# Patient Record
Sex: MALE
[De-identification: names, ages, dates, MRNs, and addresses within clinical notes are randomized per-mention and may not be internally consistent; named-entity substitution may affect disease eponyms.]

---

## 2019-06-10 ENCOUNTER — NON-APPOINTMENT (OUTPATIENT)
Age: 34
End: 2019-06-10

## 2019-06-10 ENCOUNTER — LABORATORY RESULT (OUTPATIENT)
Age: 34
End: 2019-06-10

## 2019-06-10 ENCOUNTER — APPOINTMENT (OUTPATIENT)
Dept: CARDIOLOGY | Facility: CLINIC | Age: 34
End: 2019-06-10
Payer: COMMERCIAL

## 2019-06-10 VITALS
BODY MASS INDEX: 38.36 KG/M2 | HEIGHT: 76 IN | SYSTOLIC BLOOD PRESSURE: 148 MMHG | OXYGEN SATURATION: 97 % | DIASTOLIC BLOOD PRESSURE: 74 MMHG | WEIGHT: 315 LBS | HEART RATE: 98 BPM | RESPIRATION RATE: 18 BRPM

## 2019-06-10 DIAGNOSIS — Z00.00 ENCOUNTER FOR GENERAL ADULT MEDICAL EXAMINATION W/OUT ABNORMAL FINDINGS: ICD-10-CM

## 2019-06-10 DIAGNOSIS — Z92.89 PERSONAL HISTORY OF OTHER MEDICAL TREATMENT: ICD-10-CM

## 2019-06-10 DIAGNOSIS — Z83.3 FAMILY HISTORY OF DIABETES MELLITUS: ICD-10-CM

## 2019-06-10 PROCEDURE — 93000 ELECTROCARDIOGRAM COMPLETE: CPT

## 2019-06-10 PROCEDURE — 99204 OFFICE O/P NEW MOD 45 MIN: CPT

## 2019-06-10 RX ORDER — LABETALOL HYDROCHLORIDE 200 MG/1
200 TABLET, FILM COATED ORAL TWICE DAILY
Qty: 120 | Refills: 3 | Status: ACTIVE | COMMUNITY
Start: 2019-06-10 | End: 1900-01-01

## 2019-06-10 RX ORDER — IXEKIZUMAB 80 MG/ML
80 INJECTION, SOLUTION SUBCUTANEOUS
Refills: 0 | Status: ACTIVE | COMMUNITY

## 2019-06-10 RX ORDER — FUROSEMIDE 20 MG/1
20 TABLET ORAL
Qty: 14 | Refills: 1 | Status: ACTIVE | COMMUNITY
Start: 2019-06-10 | End: 1900-01-01

## 2019-06-10 RX ORDER — LOSARTAN POTASSIUM 100 MG/1
100 TABLET, FILM COATED ORAL DAILY
Qty: 1 | Refills: 3 | Status: ACTIVE | COMMUNITY
Start: 1900-01-01 | End: 1900-01-01

## 2019-06-10 RX ORDER — AMLODIPINE BESYLATE 10 MG/1
10 TABLET ORAL
Refills: 0 | Status: DISCONTINUED | COMMUNITY
End: 2019-06-10

## 2019-06-11 ENCOUNTER — APPOINTMENT (OUTPATIENT)
Dept: CARDIOLOGY | Facility: CLINIC | Age: 34
End: 2019-06-11
Payer: COMMERCIAL

## 2019-06-11 ENCOUNTER — MEDICATION RENEWAL (OUTPATIENT)
Age: 34
End: 2019-06-11

## 2019-06-11 DIAGNOSIS — E78.5 HYPERLIPIDEMIA, UNSPECIFIED: ICD-10-CM

## 2019-06-11 LAB
ALBUMIN SERPL ELPH-MCNC: 4.8 G/DL
ALP BLD-CCNC: 119 U/L
ALT SERPL-CCNC: 73 U/L
ANION GAP SERPL CALC-SCNC: 15 MMOL/L
AST SERPL-CCNC: 33 U/L
BASOPHILS # BLD AUTO: 0.04 K/UL
BASOPHILS NFR BLD AUTO: 0.4 %
BILIRUB SERPL-MCNC: 0.3 MG/DL
BUN SERPL-MCNC: 14 MG/DL
CALCIUM SERPL-MCNC: 10 MG/DL
CHLORIDE SERPL-SCNC: 106 MMOL/L
CHOLEST SERPL-MCNC: 213 MG/DL
CHOLEST/HDLC SERPL: 6.1 RATIO
CO2 SERPL-SCNC: 24 MMOL/L
CREAT SERPL-MCNC: 0.78 MG/DL
EOSINOPHIL # BLD AUTO: 0.25 K/UL
EOSINOPHIL NFR BLD AUTO: 2.7 %
ESTIMATED AVERAGE GLUCOSE: 108 MG/DL
GGT SERPL-CCNC: 58 U/L
GLUCOSE SERPL-MCNC: 87 MG/DL
HBA1C MFR BLD HPLC: 5.4 %
HCT VFR BLD CALC: 49.5 %
HDLC SERPL-MCNC: 35 MG/DL
HGB BLD-MCNC: 15.8 G/DL
IMM GRANULOCYTES NFR BLD AUTO: 0.3 %
LDLC SERPL CALC-MCNC: 139 MG/DL
LYMPHOCYTES # BLD AUTO: 2.6 K/UL
LYMPHOCYTES NFR BLD AUTO: 27.9 %
MAGNESIUM SERPL-MCNC: 2.2 MG/DL
MAN DIFF?: NORMAL
MCHC RBC-ENTMCNC: 27.3 PG
MCHC RBC-ENTMCNC: 31.9 GM/DL
MCV RBC AUTO: 85.5 FL
MONOCYTES # BLD AUTO: 0.65 K/UL
MONOCYTES NFR BLD AUTO: 7 %
NEUTROPHILS # BLD AUTO: 5.74 K/UL
NEUTROPHILS NFR BLD AUTO: 61.7 %
NT-PROBNP SERPL-MCNC: 27 PG/ML
PHOSPHATE SERPL-MCNC: 4.2 MG/DL
PLATELET # BLD AUTO: 195 K/UL
POTASSIUM SERPL-SCNC: 4.5 MMOL/L
PROT SERPL-MCNC: 8 G/DL
RBC # BLD: 5.79 M/UL
RBC # FLD: 13.1 %
SODIUM SERPL-SCNC: 145 MMOL/L
T3RU NFR SERPL: 1.1 TBI
T4 FREE SERPL-MCNC: 1 NG/DL
T4 SERPL-MCNC: 5.4 UG/DL
TRIGL SERPL-MCNC: 196 MG/DL
TSH SERPL-ACNC: 1.83 UIU/ML
URATE SERPL-MCNC: 5.9 MG/DL
WBC # FLD AUTO: 9.31 K/UL

## 2019-06-11 PROCEDURE — 93306 TTE W/DOPPLER COMPLETE: CPT

## 2019-06-12 ENCOUNTER — APPOINTMENT (OUTPATIENT)
Dept: CARDIOLOGY | Facility: CLINIC | Age: 34
End: 2019-06-12

## 2019-06-12 LAB — 25(OH)D3 SERPL-MCNC: 30.3 NG/ML

## 2019-06-14 PROBLEM — Z00.00 ENCOUNTER FOR PREVENTIVE HEALTH EXAMINATION: Status: ACTIVE | Noted: 2019-06-05

## 2019-06-14 PROBLEM — Z92.89 HISTORY OF RECENT HOSPITALIZATION: Status: ACTIVE | Noted: 2019-06-14

## 2019-06-14 NOTE — PHYSICAL EXAM
[Normal Appearance] : normal appearance [General Appearance - Well Developed] : well developed [General Appearance - Well Nourished] : well nourished [Well Groomed] : well groomed [No Deformities] : no deformities [General Appearance - In No Acute Distress] : no acute distress [Normal Conjunctiva] : the conjunctiva exhibited no abnormalities [Normal Oral Mucosa] : normal oral mucosa [Eyelids - No Xanthelasma] : the eyelids demonstrated no xanthelasmas [No Oral Pallor] : no oral pallor [No Oral Cyanosis] : no oral cyanosis [Normal Jugular Venous V Waves Present] : normal jugular venous V waves present [No Jugular Venous Silverio A Waves] : no jugular venous silevrio A waves [Normal Jugular Venous A Waves Present] : normal jugular venous A waves present [Respiration, Rhythm And Depth] : normal respiratory rhythm and effort [Exaggerated Use Of Accessory Muscles For Inspiration] : no accessory muscle use [Auscultation Breath Sounds / Voice Sounds] : lungs were clear to auscultation bilaterally [Abdomen Soft] : soft [Abdomen Tenderness] : non-tender [Abdomen Mass (___ Cm)] : no abdominal mass palpated [Abnormal Walk] : normal gait [Gait - Sufficient For Exercise Testing] : the gait was sufficient for exercise testing [Nail Clubbing] : no clubbing of the fingernails [Cyanosis, Localized] : no localized cyanosis [Petechial Hemorrhages (___cm)] : no petechial hemorrhages [Skin Color & Pigmentation] : normal skin color and pigmentation [] : no rash [No Venous Stasis] : no venous stasis [Skin Lesions] : no skin lesions [No Skin Ulcers] : no skin ulcer [No Xanthoma] : no  xanthoma was observed [Oriented To Time, Place, And Person] : oriented to person, place, and time [Affect] : the affect was normal [Mood] : the mood was normal [No Anxiety] : not feeling anxious [FreeTextEntry1] : Pt has 2 +edema, pitting bilaterally legs

## 2019-06-14 NOTE — HISTORY OF PRESENT ILLNESS
[FreeTextEntry1] : The patient presents today with complaints of occasional episodes of dyspnea that occur with rest and and exertion there's no regular  discernible pattern the patient does not feel it is associated with anxiety patient denies substernal chest pain, PND, with orthopnea and palpitations\par Patient is complaining about substernal chest discomfort occurring  with and without exertion over the past several days. The patient denies increased in frequency or change in the quality of the pain during this time period. The pain is in the center of the chest radiating to left side and can occasionally go to then to the left arm and into the epigastric area. The patient denies associated diaphoresis, nausea, cough, sputum production, wheezing, pedal edema, PND or palpitations.\par [facility] he had a workup there was told everything was  "normal".Since that time his no have become much more swollen and he is concerned because of his worsening symptomatology as described above. He presents today with his significant other who works at [employer].\par \par \par

## 2019-07-03 ENCOUNTER — MEDICATION RENEWAL (OUTPATIENT)
Age: 34
End: 2019-07-03

## 2019-07-03 RX ORDER — ATORVASTATIN CALCIUM 20 MG/1
20 TABLET, FILM COATED ORAL
Qty: 90 | Refills: 1 | Status: ACTIVE | COMMUNITY
Start: 2019-06-11 | End: 1900-01-01

## 2019-08-14 ENCOUNTER — APPOINTMENT (OUTPATIENT)
Dept: OTOLARYNGOLOGY | Facility: CLINIC | Age: 34
End: 2019-08-14
Payer: COMMERCIAL

## 2019-08-14 VITALS
WEIGHT: 315 LBS | BODY MASS INDEX: 38.36 KG/M2 | DIASTOLIC BLOOD PRESSURE: 85 MMHG | SYSTOLIC BLOOD PRESSURE: 133 MMHG | HEIGHT: 76 IN | HEART RATE: 76 BPM

## 2019-08-14 DIAGNOSIS — R06.89 OTHER ABNORMALITIES OF BREATHING: ICD-10-CM

## 2019-08-14 DIAGNOSIS — Z78.9 OTHER SPECIFIED HEALTH STATUS: ICD-10-CM

## 2019-08-14 DIAGNOSIS — G47.30 SLEEP APNEA, UNSPECIFIED: ICD-10-CM

## 2019-08-14 DIAGNOSIS — R94.31 ABNORMAL ELECTROCARDIOGRAM [ECG] [EKG]: ICD-10-CM

## 2019-08-14 DIAGNOSIS — J34.2 DEVIATED NASAL SEPTUM: ICD-10-CM

## 2019-08-14 DIAGNOSIS — I10 ESSENTIAL (PRIMARY) HYPERTENSION: ICD-10-CM

## 2019-08-14 DIAGNOSIS — Z82.49 FAMILY HISTORY OF ISCHEMIC HEART DISEASE AND OTHER DISEASES OF THE CIRCULATORY SYSTEM: ICD-10-CM

## 2019-08-14 DIAGNOSIS — E66.01 MORBID (SEVERE) OBESITY DUE TO EXCESS CALORIES: ICD-10-CM

## 2019-08-14 DIAGNOSIS — Z82.2 FAMILY HISTORY OF DEAFNESS AND HEARING LOSS: ICD-10-CM

## 2019-08-14 DIAGNOSIS — R60.0 LOCALIZED EDEMA: ICD-10-CM

## 2019-08-14 DIAGNOSIS — R06.09 OTHER FORMS OF DYSPNEA: ICD-10-CM

## 2019-08-14 PROCEDURE — 31575 DIAGNOSTIC LARYNGOSCOPY: CPT

## 2019-08-14 PROCEDURE — 99205 OFFICE O/P NEW HI 60 MIN: CPT | Mod: 25

## 2019-08-14 RX ORDER — MOMETASONE 50 UG/1
50 SPRAY, METERED NASAL DAILY
Qty: 1 | Refills: 5 | Status: ACTIVE | COMMUNITY
Start: 2019-08-14 | End: 1900-01-01

## 2019-08-14 RX ORDER — FLUTICASONE PROPIONATE 50 UG/1
50 SPRAY, METERED NASAL DAILY
Qty: 1 | Refills: 5 | Status: ACTIVE | COMMUNITY
Start: 2019-08-14 | End: 1900-01-01

## 2019-08-14 NOTE — PROCEDURE
[Image(s) Captured] : image(s) captured and filed [Unable to Cooperate with Mirror] : patient unable to cooperate with mirror [Obstruction] : acute airway obstruction evaluation [Complicated Symptoms] : complicated symptoms requiring more thorough examination than provided by mirror [Topical Lidocaine] : topical lidocaine [Oxymetazoline HCl] : oxymetazoline HCl [Flexible Endoscope] : examined with the flexible endoscope [Serial Number: ___] : Serial Number: [unfilled] [Velopharynx ___ %] : the velopharynx was [unfilled]U% collapsed [Normal] : the false vocal folds were pink and regular, the ventricular sulcus was open, the true vocal folds were glistening white, tense and of equal length, mobility, and height [True Vocal Cords Paralysis] : no true vocal cord paralysis [True Vocal Cords Erythematous] : no true vocal cord edema [True Vocal Cords Uriostegui's Nodules] : no true vocal cord nodules [Glottis Arytenoid Cartilages] : no arytenoid granulomas [Glottis Arytenoid Cartilages Erythema] : no arytenoid erythema [Arytenoid Edema ___ /4] : bilaterally were normal [Arytenoid Erythema ___ /4] : bilaterally were normal [de-identified] : Patient was placed in the examination chair in a sitting position. The nose was decongested with oxymetazoline nasal solution. The airway was anesthetized with 4% Xylocaine.  The back of the throat was anesthetized with Cetacaine. Direct flexible/rigid video endoscopy was performed. Findings revealed:\par Patient a bilaterally deviated fractured nasal septum turbinate hypertrophy positive Reyna was with 4+ tonsils meeting in the midline. An aspiration area completely closed. Base of tongue looked normal larynx normal [de-identified] : GISSELL [FreeTextEntry3] : + Kiran

## 2019-08-14 NOTE — PHYSICAL EXAM
[] : septum deviated bilaterally [Midline] : trachea located in midline position [Laryngoscopy Performed] : laryngoscopy was performed, see procedure section for findings [Normal] : no rashes [FreeTextEntry1] : obese [de-identified] : swollen [de-identified] : 4+ midline

## 2019-08-14 NOTE — CONSULT LETTER
[Dear  ___] : Dear  [unfilled], [Consult Letter:] : I had the pleasure of evaluating your patient, [unfilled]. [Consult Closing:] : Thank you very much for allowing me to participate in the care of this patient.  If you have any questions, please do not hesitate to contact me. [Please see my note below.] : Please see my note below. [Sincerely,] : Sincerely, [FreeTextEntry3] : Murphy Mckay MD, IZABEL, FACS\par  Department Otolaryngology\par Director of [facility]\par Professor of Otolaryngology, \par [facility] [employer]\par

## 2019-08-14 NOTE — HISTORY OF PRESENT ILLNESS
[de-identified] : 33 year old male here for difficulty breathing through the nose.  States has had difficulty breathing through the nose for years.  States nasal congestion, sinus pain/pressure, post nasal drip.  States is a mouth breathing, wakes in the am with thick phlegm in the back of the throat.  Denies sinus infections in the past year.  States 1.5 weeks ago had a sleep study, no results in yet.  STates used to play a lot of sports, not sure if even broke his nose or not. The patient previously on Flonase without relief over years. Patient just had an angiogram. Patient is on multiple medications to control blood pressure. Pt. his cardiologist every week to try to manage this problem. Patient did have a sleep study with CPAP trial. When they  put the mask on he felt that he was choking and had no airway.

## 2019-08-14 NOTE — REASON FOR VISIT
[Initial Evaluation] : an initial evaluation for [Other: _____] : [unfilled] [FreeTextEntry2] : here for difficulty breathing through the nose

## 2019-09-11 ENCOUNTER — RX RENEWAL (OUTPATIENT)
Age: 34
End: 2019-09-11

## 2019-09-11 RX ORDER — FLUTICASONE PROPIONATE 50 UG/1
50 SPRAY, METERED NASAL DAILY
Qty: 3 | Refills: 3 | Status: ACTIVE | COMMUNITY
Start: 2019-09-11 | End: 1900-01-01

## 2021-10-05 ENCOUNTER — APPOINTMENT (OUTPATIENT)
Dept: UROLOGY | Facility: CLINIC | Age: 36
End: 2021-10-05

## 2021-10-05 NOTE — PHYSICAL EXAM
[General Appearance - Well Developed] : well developed [General Appearance - Well Nourished] : well nourished [Normal Appearance] : normal appearance [Well Groomed] : well groomed [General Appearance - In No Acute Distress] : no acute distress [Heart Rate And Rhythm] : Heart rate and rhythm were normal [Arterial Pulses Normal] : the pedal pulses were normal [Edema] : no peripheral edema [Respiration, Rhythm And Depth] : normal respiratory rhythm and effort [Exaggerated Use Of Accessory Muscles For Inspiration] : no accessory muscle use [Auscultation Breath Sounds / Voice Sounds] : lungs were clear to auscultation bilaterally [Chest Palpation] : palpation of the chest revealed no abnormalities [Lungs Percussion] : the lungs were normal to percussion [Bowel Sounds] : normal bowel sounds [Abdomen Soft] : soft [Abdomen Tenderness] : non-tender [Abdomen Mass (___ Cm)] : no abdominal mass palpated [Abdomen Hernia] : no hernia was discovered [Costovertebral Angle Tenderness] : no ~M costovertebral angle tenderness [Urethral Meatus] : meatus normal [Urinary Bladder Findings] : the bladder was normal on palpation [Scrotum] : the scrotum was normal [Epididymis] : the epididymides were normal [Testes Tenderness] : no tenderness of the testes [Testes Mass (___cm)] : there were no testicular masses [Anus Abnormality] : the anus and perineum were normal [No Prostate Nodules] : no prostate nodules [Normal Station and Gait] : the gait and station were normal for the patient's age [Skin Color & Pigmentation] : normal skin color and pigmentation [Skin Turgor] : supple [Skin Lesions] : no skin lesions [] : no rash [No Focal Deficits] : no focal deficits [Sensation] : the sensory exam was normal to light touch and pinprick [Motor Exam] : the motor exam was normal [Oriented To Time, Place, And Person] : oriented to person, place, and time [Affect] : the affect was normal [Mood] : the mood was normal [Not Anxious] : not anxious [No Palpable Adenopathy] : no palpable adenopathy [Cervical Lymph Nodes Enlarged Posterior Bilaterally] : posterior cervical [Cervical Lymph Nodes Enlarged Anterior Bilaterally] : anterior cervical [Supraclavicular Lymph Nodes Enlarged Bilaterally] : supraclavicular [Axillary Lymph Nodes Enlarged Bilaterally] : axillary [Femoral Lymph Nodes Enlarged Bilaterally] : femoral [Inguinal Lymph Nodes Enlarged Bilaterally] : inguinal

## 2021-10-05 NOTE — HISTORY OF PRESENT ILLNESS
[FreeTextEntry1] : Patient is a 36-year-old gentleman who presents with the chief complaint of impaired fertility for evaluation. The patient denies fevers, chills, nausea and/or vomiting and no unexplained weight loss.   I reviewed the questionnaire he had completed with him in detail.  His wife, rashel, was not able to accompany him to the visit today. She has not had any prior pregnancies. As I understand her evaluation has been normal to date. They have been trying to achieve pregnancy for the past 6 months without conception. This issue causes both he and his partner significant distress.   He has no known drug allergies.  His past medical history demonstrates no significant urologic issues (see patient questionnaire).  In his present occupation as a he has no known toxin exposure.  He does not smoke and drinks only socially.  He has no known drug allergies.  His review of systems is non-contributory. His family history is not significant.\par

## 2021-10-05 NOTE — LETTER BODY
[FreeTextEntry1] : Dear Dr. Raheel Blanco,\par \par Thank you for referring your patient Guanakito Medina for consultation for impaired fertility.  I have requested several baseline blood studies and a semen analysis. I will see the patient back in followup shortly and make further recommendations. I have attached a copy of my consultation note for your records.\par \par Thank you again for this kind referral. I will certainly keep you updated with further progress. Please do not hesitate to call me if you have any questions.\par \par Best regards,\par \par \par \par Tato Dumont M.D., PhD\par Professor of Urology\par    [facility] [employer] [facility]\par  for Quality\par Director, Reproductive and Sexual Medicine\par    [facility]

## 2021-12-09 ENCOUNTER — APPOINTMENT (OUTPATIENT)
Dept: UROLOGY | Facility: CLINIC | Age: 36
End: 2021-12-09

## 2021-12-09 DIAGNOSIS — I86.1 SCROTAL VARICES: ICD-10-CM

## 2021-12-09 DIAGNOSIS — Z86.79 PERSONAL HISTORY OF OTHER DISEASES OF THE CIRCULATORY SYSTEM: ICD-10-CM

## 2023-08-10 ENCOUNTER — APPOINTMENT (OUTPATIENT)
Dept: UROLOGY | Facility: CLINIC | Age: 38
End: 2023-08-10
Payer: COMMERCIAL

## 2023-08-10 ENCOUNTER — LABORATORY RESULT (OUTPATIENT)
Age: 38
End: 2023-08-10

## 2023-08-10 VITALS
DIASTOLIC BLOOD PRESSURE: 136 MMHG | SYSTOLIC BLOOD PRESSURE: 193 MMHG | HEART RATE: 69 BPM | WEIGHT: 253 LBS | HEIGHT: 76 IN | BODY MASS INDEX: 30.81 KG/M2

## 2023-08-10 PROCEDURE — 99204 OFFICE O/P NEW MOD 45 MIN: CPT

## 2023-08-10 NOTE — HISTORY OF PRESENT ILLNESS
[FreeTextEntry1] : Patient is a 37-year-old gentleman who presents with a chief complaint of low testosterone and fertility. He states that this has been present for the past 5 years.  He states that his erections are presently normal. When he get them.. He has psoriasis and takes taltz.. He describes a markedly decreased libido. He is  without children. He has had elevated blood pressure and is under the care of Dr. Birmingham. He had gastric bypass surgery in February 2022.  The patient denies fevers, chills, nausea and/or vomiting and no unexplained weight loss. His past medical history is non-contributory. 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 He does not smoke and drinks socially. He has no known drug allergies. His review of systems and past medical history demonstrates no significant urologic issues (see patient completed questionnaire). His family history demonstrates no significant urologic issues.

## 2023-08-10 NOTE — ASSESSMENT
[FreeTextEntry1] : This pleasant gentleman presents for evaluation of his testosterone, low libido and fertility concerns.. I have requested several blood studies and a urine analysis. I will make further recommendations when the results return. We discussed his evaluation today and possible options for therapy depending on the results of his testing. I will be better able to make more specific recommendations after additional test results return.  1. Review blood tests, semen analysis and scrotal ultrasound on follow up 2. Discuss options on Follow up 3. Evaluation of his hypertension by Dr. Birmingham  Consultation: 40 minutes 20 minutes reviewing his history and discussing prior results. 20 minutes discussing various treatment options, writing medication prescriptions, requests for lab testing and writing his note.

## 2023-08-10 NOTE — LETTER BODY
[FreeTextEntry1] : Dear Dr. Blanco ,   Thank you for referring your patient Guanakito Medina  for consultation for right testicular discomfort and fertility concerns. I have requested several baseline blood studies. I will see the patient back in follow-up shortly and make further recommendations. I have attached a copy of my consultation note for your records.  Thank you again for this kind referral. I will certainly keep you updated with further progress. Please do not hesitate to call me if you have any questions.  Best regards,   Tato Dumont M.D., PhD Professor of Urology [facility] Vice-Chair For Urology , Reproductive and Sexual Medicine [facility]

## 2023-08-11 LAB
25(OH)D3 SERPL-MCNC: 40.8 NG/ML
ALBUMIN SERPL ELPH-MCNC: 5.1 G/DL
ALP BLD-CCNC: 145 U/L
ALT SERPL-CCNC: 35 U/L
ANION GAP SERPL CALC-SCNC: 13 MMOL/L
APO A-I SERPL-MCNC: 146 MG/DL
APO A-I/APO B SERPL: 0.85 RATIO
APO B SERPL-MCNC: 124 MG/DL
APPEARANCE: CLEAR
AST SERPL-CCNC: 22 U/L
BILIRUB SERPL-MCNC: 0.5 MG/DL
BILIRUBIN URINE: NEGATIVE
BLOOD URINE: NEGATIVE
BUN SERPL-MCNC: 18 MG/DL
CALCIUM SERPL-MCNC: 10 MG/DL
CHLORIDE SERPL-SCNC: 102 MMOL/L
CHOLEST SERPL-MCNC: 225 MG/DL
CO2 SERPL-SCNC: 27 MMOL/L
COLOR: NORMAL
CREAT SERPL-MCNC: 0.82 MG/DL
CRP SERPL HS-MCNC: 1.68 MG/L
EGFR: 115 ML/MIN/1.73M2
ESTIMATED AVERAGE GLUCOSE: 100 MG/DL
ESTRADIOL SERPL-MCNC: 26 PG/ML
FSH SERPL-MCNC: 3.1 IU/L
GLUCOSE QUALITATIVE U: NEGATIVE MG/DL
GLUCOSE SERPL-MCNC: 84 MG/DL
HBA1C MFR BLD HPLC: 5.1 %
HDLC SERPL-MCNC: 45 MG/DL
KETONES URINE: ABNORMAL MG/DL
LDLC SERPL CALC-MCNC: 145 MG/DL
LEUKOCYTE ESTERASE URINE: NEGATIVE
LH SERPL-ACNC: 1.6 IU/L
NITRITE URINE: NEGATIVE
NONHDLC SERPL-MCNC: 180 MG/DL
PH URINE: 6
POTASSIUM SERPL-SCNC: 4.1 MMOL/L
PROLACTIN SERPL-MCNC: 5.5 NG/ML
PROT SERPL-MCNC: 7.7 G/DL
PROTEIN URINE: 100 MG/DL
PSA SERPL-MCNC: 0.25 NG/ML
SODIUM SERPL-SCNC: 142 MMOL/L
SPECIFIC GRAVITY URINE: 1.03
TRIGL SERPL-MCNC: 194 MG/DL
TSH SERPL-ACNC: 2.11 UIU/ML
UROBILINOGEN URINE: 0.2 MG/DL

## 2023-08-15 LAB
TESTOST FREE SERPL-MCNC: 0.8 PG/ML
TESTOST SERPL-MCNC: 265 NG/DL

## 2023-09-25 PROBLEM — E34.9 TESTOSTERONE DEFICIENCY: Status: ACTIVE | Noted: 2023-08-08

## 2023-09-26 ENCOUNTER — APPOINTMENT (OUTPATIENT)
Dept: UROLOGY | Facility: CLINIC | Age: 38
End: 2023-09-26
Payer: COMMERCIAL

## 2023-09-26 DIAGNOSIS — E34.9 ENDOCRINE DISORDER, UNSPECIFIED: ICD-10-CM

## 2023-09-26 PROCEDURE — 99214 OFFICE O/P EST MOD 30 MIN: CPT | Mod: 95

## 2024-01-18 ENCOUNTER — APPOINTMENT (OUTPATIENT)
Dept: UROLOGY | Facility: CLINIC | Age: 39
End: 2024-01-18
Payer: COMMERCIAL

## 2024-01-18 PROCEDURE — 99214 OFFICE O/P EST MOD 30 MIN: CPT | Mod: 95

## 2024-01-18 PROCEDURE — G2211 COMPLEX E/M VISIT ADD ON: CPT

## 2024-01-18 RX ORDER — CLOMIPHENE CITRATE 50 MG/1
50 TABLET ORAL
Qty: 45 | Refills: 1 | Status: ACTIVE | COMMUNITY
Start: 2023-09-26 | End: 1900-01-01

## 2024-01-18 NOTE — ASSESSMENT
[FreeTextEntry1] : The patient returns for follow-up.  He has been taking 25 mg of clomiphene citrate daily for the past 3 months.    August 10, 2023 laboratory studies demonstrated a urinalysis with 100 mg/dL of protein and trace ketones. Medical evaluation was suggested. His microscopic demonstrated 4-red blood cells per high-power field with some mucus and calcium oxalate crystals present his hematocrit was 47.3%, is ApoA-I to be ratio was 0.85 and medical evaluation was requested. His PSA was 0.25 ng/mL estradiol 26 pg/mL, prolactin 5.5 ng/mL, FSH 3.1 IU/L, TSH 2.11 micro liter, LH 1.6 IU/L, vitamin D 25 was 40.8 ng/mL. He had dyslipidemia and medical evaluation was suggested. His hemoglobin A1c was 5.1. Testosterone free 0.8 pg/mL and testosterone total 265 ng deciliter a DEXA scan requested as well as repeat blood studies.  Unfortunately, he did not get his blood studies 3 weeks after he started the medication.  He has been off it for 1 day.  He will restart and obtain blood tests within the week he will also obtain a semen analysis.  Will then discuss the results 3 to 4 weeks from now.  I have written for for renewal of the medication.  Telehealth Consultation: 30 minutes 10 minutes reviewing his history and discussing prior results. 20 minutes discussing various treatment options, writing medication prescriptions, requests for lab testing and writing his note. There was also additional time in preparing for the visit and assisting the patient with technology issues he was having with the telehealth platform.

## 2024-01-18 NOTE — HISTORY OF PRESENT ILLNESS
[FreeTextEntry1] : The patient-doctor. relationship has been established in a face-to-face fashion on real-time video audio HIPAA compliant communication using telemedicine software. The patient was at home and the physician was in his office. The patient's identity has been confirmed.  The patient was previously emailed a copy of the telemedicine consent.  The patient has had a chance to review and has now given verbal consent and has requested care to be assessed and treated through telemedicine. The patient understands there may be limitations in this process and that they need not need further follow-up care in the office and/or hospital settings. We were unable to use the [employer] platform and an alternative platform was utilized.  The patient was at home and I was in the office.  Verbal consent was given on Tuesday, September 26, 2023 at 9 AM by the patient.  It was requested by the physician.  A written consent was previously sent for the patient to sign and return.  The patient returns for follow-up.  He has been taking 25 mg of clomiphene citrate daily for the past 3 months.   PMH: Patient is a 37-year-old gentleman who presents with a chief complaint of low testosterone and fertility. He states that this has been present for the past 5 years.  He states that his erections are presently normal. When he get them.. He has psoriasis and takes taltz.. He describes a markedly decreased libido. He is  without children. He has had elevated blood pressure and is under the care of Dr. Birmingham. He had gastric bypass surgery in February 2022.  The patient denies fevers, chills, nausea and/or vomiting and no unexplained weight loss. His past medical history is non-contributory. 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 He does not smoke and drinks socially. He has no known drug allergies. His review of systems and past medical history demonstrates no significant urologic issues (see patient completed questionnaire). His family history demonstrates no significant urologic issues.

## 2024-08-01 ENCOUNTER — APPOINTMENT (OUTPATIENT)
Dept: UROLOGY | Facility: CLINIC | Age: 39
End: 2024-08-01
Payer: COMMERCIAL

## 2024-08-01 DIAGNOSIS — E34.9 ENDOCRINE DISORDER, UNSPECIFIED: ICD-10-CM

## 2024-08-01 PROCEDURE — G2211 COMPLEX E/M VISIT ADD ON: CPT | Mod: NC

## 2024-08-01 PROCEDURE — 99214 OFFICE O/P EST MOD 30 MIN: CPT

## 2024-08-01 NOTE — HISTORY OF PRESENT ILLNESS
[FreeTextEntry1] : The patient-doctor. relationship has been established in a face-to-face fashion on real-time video audio HIPAA compliant communication using telemedicine software. The patient was at home and the physician was in his office. The patient's identity has been confirmed.  The patient was previously emailed a copy of the telemedicine consent.  The patient has had a chance to review and has now given verbal consent and has requested care to be assessed and treated through telemedicine. The patient understands there may be limitations in this process and that they need not need further follow-up care in the office and/or hospital settings. We were unable to use the [employer] platform and an alternative platform was utilized.  The patient was at home and I was in the office.  Verbal consent was given on Tuesday, September 26, 2023 at 9 AM by the patient.  It was requested by the physician.  A written consent was previously sent for the patient to sign and return.  The patient returns for follow-up.  He stopped taking 25 mg of clomiphene citrate daily when he ran out of the medication.  He was last seen January 18, 2024.  He has not repeat blood studies since August 10, 2023.  PMH: Patient is a 37-year-old gentleman who presents with a chief complaint of low testosterone and fertility. He states that this has been present for the past 5 years.  He states that his erections are presently normal. When he get them.. He has psoriasis and takes taltz.. He describes a markedly decreased libido. He is  without children. He has had elevated blood pressure and is under the care of Dr. Birminhgam. He had gastric bypass surgery in February 2022.  The patient denies fevers, chills, nausea and/or vomiting and no unexplained weight loss. His past medical history is non-contributory. 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 He does not smoke and drinks socially. He has no known drug allergies. His review of systems and past medical history demonstrates no significant urologic issues (see patient completed questionnaire). His family history demonstrates no significant urologic issues.

## 2024-08-01 NOTE — HISTORY OF PRESENT ILLNESS
[FreeTextEntry1] : The patient-doctor. relationship has been established in a face-to-face fashion on real-time video audio HIPAA compliant communication using telemedicine software. The patient was at home and the physician was in his office. The patient's identity has been confirmed.  The patient was previously emailed a copy of the telemedicine consent.  The patient has had a chance to review and has now given verbal consent and has requested care to be assessed and treated through telemedicine. The patient understands there may be limitations in this process and that they need not need further follow-up care in the office and/or hospital settings. We were unable to use the [employer] platform and an alternative platform was utilized.  The patient was at home and I was in the office.  Verbal consent was given on Tuesday, September 26, 2023 at 9 AM by the patient.  It was requested by the physician.  A written consent was previously sent for the patient to sign and return.  The patient returns for follow-up.  He stopped taking 25 mg of clomiphene citrate daily when he ran out of the medication.  He was last seen January 18, 2024.  He has not repeat blood studies since August 10, 2023.  PMH: Patient is a 37-year-old gentleman who presents with a chief complaint of low testosterone and fertility. He states that this has been present for the past 5 years.  He states that his erections are presently normal. When he get them.. He has psoriasis and takes taltz.. He describes a markedly decreased libido. He is  without children. He has had elevated blood pressure and is under the care of Dr. Birmingham. He had gastric bypass surgery in February 2022.  The patient denies fevers, chills, nausea and/or vomiting and no unexplained weight loss. His past medical history is non-contributory. 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 He does not smoke and drinks socially. He has no known drug allergies. His review of systems and past medical history demonstrates no significant urologic issues (see patient completed questionnaire). His family history demonstrates no significant urologic issues.

## 2024-08-01 NOTE — ASSESSMENT
[FreeTextEntry1] : The patient returns for follow-up.  He stopped taking 25 mg of clomiphene citrate daily when he ran out of the medication.  He was last seen January 18, 2024.  He has not repeat blood studies since August 10, 2023.  August 10, 2023 laboratory studies demonstrated a urinalysis with 100 mg/dL of protein and trace ketones. Medical evaluation was suggested. His microscopic demonstrated 4-red blood cells per high-power field with some mucus and calcium oxalate crystals present his hematocrit was 47.3%, is ApoA-I to be ratio was 0.85 and medical evaluation was requested. His PSA was 0.25 ng/mL estradiol 26 pg/mL, prolactin 5.5 ng/mL, FSH 3.1 IU/L, TSH 2.11 micro liter, LH 1.6 IU/L, vitamin D 25 was 40.8 ng/mL. He had dyslipidemia and medical evaluation was suggested. His hemoglobin A1c was 5.1. Testosterone free 0.8 pg/mL and testosterone total 265 ng deciliter a DEXA scan requested as well as repeat blood studies.  He has not been compliant with blood studies and taking the medication.  I emphasized to him the importance of close follow-up.  I have rewritten from medication and for blood studies which we will review in 2 weeks when I see him back in person for a physical exam and follow up..  He is also not obtained an MRI or DEXA scan which I have rewritten for.  Telehealth Consultation: 30 minutes 10 minutes reviewing his history and discussing prior results. 20 minutes discussing various treatment options, writing requests for lab testing and writing his note. There was also additional time in preparing for the visit and assisting the patient with technology issues he was having with the telehealth platform.

## 2024-08-09 LAB
25(OH)D3 SERPL-MCNC: 33.7 NG/ML
ALBUMIN SERPL ELPH-MCNC: 5 G/DL
ALP BLD-CCNC: 104 U/L
ALT SERPL-CCNC: 32 U/L
ANION GAP SERPL CALC-SCNC: 14 MMOL/L
APO A-I SERPL-MCNC: 128 MG/DL
APO A-I/APO B SERPL: 0.78 RATIO
APO B SERPL-MCNC: 100 MG/DL
AST SERPL-CCNC: 20 U/L
BILIRUB SERPL-MCNC: 0.7 MG/DL
BUN SERPL-MCNC: 17 MG/DL
CALCIUM SERPL-MCNC: 10 MG/DL
CHLORIDE SERPL-SCNC: 104 MMOL/L
CHOLEST SERPL-MCNC: 194 MG/DL
CO2 SERPL-SCNC: 26 MMOL/L
CREAT SERPL-MCNC: 0.97 MG/DL
CRP SERPL HS-MCNC: 1.02 MG/L
EGFR: 101 ML/MIN/1.73M2
ESTIMATED AVERAGE GLUCOSE: 97 MG/DL
ESTRADIOL SERPL-MCNC: 12 PG/ML
FSH SERPL-MCNC: 3.3 IU/L
GLUCOSE SERPL-MCNC: 115 MG/DL
HBA1C MFR BLD HPLC: 5 %
HCT VFR BLD CALC: 50.5 %
HDLC SERPL-MCNC: 43 MG/DL
HGB BLD-MCNC: 15.7 G/DL
LDLC SERPL CALC-MCNC: 131 MG/DL
LH SERPL-ACNC: 3.6 IU/L
MCHC RBC-ENTMCNC: 27.2 PG
MCHC RBC-ENTMCNC: 31.1 GM/DL
MCV RBC AUTO: 87.5 FL
NONHDLC SERPL-MCNC: 151 MG/DL
PLATELET # BLD AUTO: 224 K/UL
POTASSIUM SERPL-SCNC: 4.6 MMOL/L
PROLACTIN SERPL-MCNC: 6 NG/ML
PROT SERPL-MCNC: 7.7 G/DL
PSA SERPL-MCNC: 0.32 NG/ML
RBC # BLD: 5.77 M/UL
RBC # FLD: 12.6 %
SODIUM SERPL-SCNC: 143 MMOL/L
TESTOST FREE SERPL-MCNC: 4.5 PG/ML
TESTOST SERPL-MCNC: 223 NG/DL
TRIGL SERPL-MCNC: 113 MG/DL
TSH SERPL-ACNC: 1.43 UIU/ML
WBC # FLD AUTO: 6.17 K/UL

## 2024-08-15 ENCOUNTER — APPOINTMENT (OUTPATIENT)
Dept: UROLOGY | Facility: CLINIC | Age: 39
End: 2024-08-15
Payer: COMMERCIAL

## 2024-08-15 VITALS
DIASTOLIC BLOOD PRESSURE: 98 MMHG | HEIGHT: 76 IN | BODY MASS INDEX: 28.49 KG/M2 | HEART RATE: 76 BPM | WEIGHT: 234 LBS | SYSTOLIC BLOOD PRESSURE: 141 MMHG

## 2024-08-15 DIAGNOSIS — E34.9 ENDOCRINE DISORDER, UNSPECIFIED: ICD-10-CM

## 2024-08-15 PROCEDURE — G2211 COMPLEX E/M VISIT ADD ON: CPT | Mod: NC

## 2024-08-15 PROCEDURE — 99214 OFFICE O/P EST MOD 30 MIN: CPT

## 2024-08-15 NOTE — HISTORY OF PRESENT ILLNESS
[FreeTextEntry1] : The patient returns for follow-up to review his recent blood studies and discuss options for therapy.  He has not been complained in the past with taking clomiphene citrate nor obtaining radiologic studies as requested.  PMH: Patient is a 37-year-old gentleman who presents with a chief complaint of low testosterone and fertility. He states that this has been present for the past 5 years.  He states that his erections are presently normal. When he get them.. He has psoriasis and takes taltz.. He describes a markedly decreased libido. He is  without children. He has had elevated blood pressure and is under the care of Dr. [place]. He had gastric bypass surgery in February 2022.  The patient denies fevers, chills, nausea and/or vomiting and no unexplained weight loss. His past medical history is non-contributory. 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 He does not smoke and drinks socially. He has no known drug allergies. His review of systems and past medical history demonstrates no significant urologic issues (see patient completed questionnaire). His family history demonstrates no significant urologic issues.

## 2024-08-15 NOTE — ASSESSMENT
[FreeTextEntry1] : The patient returns for follow-up to review his recent blood studies and discuss options for therapy.  He has not been complained in the past with taking clomiphene citrate nor obtaining radiologic studies as requested  Laboratory studies dated August 7, 2024 demonstrated a hematocrit of 50.5%, hemoglobin A1c 5%, estradiol 12 pg/mL, prolactin 6.0 ng/mL, FSH 2.3 IU/L, TSH 1.43 IU/mL, LH 3.6 IU/L, vitamin D 25 was 33.7 ng/mL.  His free testosterone was 4.5 pg/mL with a total testosterone of 223 ng/dL.  His PSA was 0.32 ng/mL, inhibin B2 174.9 pg/mL.  His urinalysis showed protein and ketones.  We discussed options. He will re-start clomid 25 mg/day, obtain blood tests in 3 weeks and discuss the results two weeks after that.  Consultation: 35 minutes reviewing his history and discussing prior results, discussing various treatment options, writing medication prescriptions, requests for lab testing and writing his note. There was also additional time in preparing for the visit.

## 2024-10-10 ENCOUNTER — APPOINTMENT (OUTPATIENT)
Dept: UROLOGY | Facility: CLINIC | Age: 39
End: 2024-10-10

## 2024-10-10 DIAGNOSIS — E34.9 ENDOCRINE DISORDER, UNSPECIFIED: ICD-10-CM

## 2024-11-05 ENCOUNTER — APPOINTMENT (OUTPATIENT)
Dept: UROLOGY | Facility: CLINIC | Age: 39
End: 2024-11-05

## 2025-02-25 ENCOUNTER — APPOINTMENT (OUTPATIENT)
Dept: UROLOGY | Facility: CLINIC | Age: 40
End: 2025-02-25
Payer: COMMERCIAL

## 2025-02-25 ENCOUNTER — TRANSCRIPTION ENCOUNTER (OUTPATIENT)
Age: 40
End: 2025-02-25

## 2025-02-25 DIAGNOSIS — N50.819 TESTICULAR PAIN, UNSPECIFIED: ICD-10-CM

## 2025-02-25 DIAGNOSIS — M62.81 MUSCLE WEAKNESS (GENERALIZED): ICD-10-CM

## 2025-02-25 PROCEDURE — 99214 OFFICE O/P EST MOD 30 MIN: CPT

## 2025-02-25 PROCEDURE — G2211 COMPLEX E/M VISIT ADD ON: CPT | Mod: NC

## 2025-02-26 PROBLEM — M62.81 MUSCULAR WEAKNESS: Status: ACTIVE | Noted: 2025-02-25

## 2025-02-26 LAB
HBV SURFACE AB SER QL: NONREACTIVE
HBV SURFACE AG SER QL: NONREACTIVE
HCT VFR BLD CALC: 43.2 %
HCV AB SER QL: NONREACTIVE
HCV S/CO RATIO: 0.07 S/CO
HGB BLD-MCNC: 14.1 G/DL
HIV1+2 AB SPEC QL IA.RAPID: NONREACTIVE
MCHC RBC-ENTMCNC: 28.8 PG
MCHC RBC-ENTMCNC: 32.6 G/DL
MCV RBC AUTO: 88.3 FL
PLATELET # BLD AUTO: 198 K/UL
RBC # BLD: 4.89 M/UL
RBC # FLD: 12.7 %
WBC # FLD AUTO: 6.05 K/UL

## 2025-02-28 LAB
C TRACH RRNA SPEC QL NAA+PROBE: NOT DETECTED
N GONORRHOEA RRNA SPEC QL NAA+PROBE: NOT DETECTED
SOURCE AMPLIFICATION: NORMAL
SOURCE AMPLIFICATION: NORMAL
T PALLIDUM AB SER QL IA: NEGATIVE
T VAGINALIS RRNA SPEC QL NAA+PROBE: NOT DETECTED

## 2025-03-04 LAB
MYCOPLASMA HOMINIS CULTURE: NEGATIVE
UREAPLASMA CULTURE: NEGATIVE

## 2025-03-05 ENCOUNTER — APPOINTMENT (OUTPATIENT)
Dept: UROLOGY | Facility: CLINIC | Age: 40
End: 2025-03-05

## 2025-09-06 ENCOUNTER — APPOINTMENT (OUTPATIENT)
Dept: AFTER HOURS CARE | Facility: EMERGENCY ROOM | Age: 40
End: 2025-09-06

## 2025-09-06 DIAGNOSIS — J01.90 ACUTE SINUSITIS, UNSPECIFIED: ICD-10-CM

## 2025-09-06 PROCEDURE — 99204 OFFICE O/P NEW MOD 45 MIN: CPT | Mod: 95

## 2025-09-06 RX ORDER — FLUTICASONE PROPIONATE 50 UG/1
50 SPRAY NASAL TWICE DAILY
Qty: 1 | Refills: 0 | Status: ACTIVE | COMMUNITY
Start: 2025-09-06 | End: 1900-01-01

## 2025-09-06 RX ORDER — AMOXICILLIN AND CLAVULANATE POTASSIUM 875; 125 MG/1; MG/1
875-125 TABLET, COATED ORAL
Qty: 14 | Refills: 0 | Status: ACTIVE | COMMUNITY
Start: 2025-09-06 | End: 1900-01-01

## 2025-09-06 RX ORDER — BENZONATATE 200 MG/1
200 CAPSULE ORAL 3 TIMES DAILY
Qty: 20 | Refills: 0 | Status: ACTIVE | COMMUNITY
Start: 2025-09-06 | End: 1900-01-01

## 2025-09-16 ENCOUNTER — APPOINTMENT (OUTPATIENT)
Dept: INTERNAL MEDICINE | Facility: CLINIC | Age: 40
End: 2025-09-16
Payer: COMMERCIAL

## 2025-09-16 DIAGNOSIS — R05.3 CHRONIC COUGH: ICD-10-CM

## 2025-09-16 PROCEDURE — 99203 OFFICE O/P NEW LOW 30 MIN: CPT | Mod: 95
